# Patient Record
Sex: FEMALE | Race: ASIAN | HISPANIC OR LATINO
[De-identification: names, ages, dates, MRNs, and addresses within clinical notes are randomized per-mention and may not be internally consistent; named-entity substitution may affect disease eponyms.]

---

## 2017-03-06 ENCOUNTER — APPOINTMENT (OUTPATIENT)
Dept: ANTEPARTUM | Facility: CLINIC | Age: 37
End: 2017-03-06

## 2017-03-06 ENCOUNTER — ASOB RESULT (OUTPATIENT)
Age: 37
End: 2017-03-06

## 2017-03-06 PROBLEM — Z00.00 ENCOUNTER FOR PREVENTIVE HEALTH EXAMINATION: Status: ACTIVE | Noted: 2017-03-06

## 2017-03-13 ENCOUNTER — APPOINTMENT (OUTPATIENT)
Dept: ANTEPARTUM | Facility: CLINIC | Age: 37
End: 2017-03-13

## 2017-03-31 ENCOUNTER — ASOB RESULT (OUTPATIENT)
Age: 37
End: 2017-03-31

## 2017-03-31 ENCOUNTER — APPOINTMENT (OUTPATIENT)
Dept: ANTEPARTUM | Facility: CLINIC | Age: 37
End: 2017-03-31

## 2017-04-07 ENCOUNTER — APPOINTMENT (OUTPATIENT)
Dept: ANTEPARTUM | Facility: CLINIC | Age: 37
End: 2017-04-07

## 2017-04-07 ENCOUNTER — ASOB RESULT (OUTPATIENT)
Age: 37
End: 2017-04-07

## 2017-04-14 ENCOUNTER — INPATIENT (INPATIENT)
Facility: HOSPITAL | Age: 37
LOS: 2 days | Discharge: HOME | End: 2017-04-17
Attending: OBSTETRICS & GYNECOLOGY | Admitting: OBSTETRICS & GYNECOLOGY
Payer: MEDICAID

## 2017-04-14 ENCOUNTER — TRANSCRIPTION ENCOUNTER (OUTPATIENT)
Age: 37
End: 2017-04-14

## 2017-04-14 ENCOUNTER — APPOINTMENT (OUTPATIENT)
Dept: ANTEPARTUM | Facility: CLINIC | Age: 37
End: 2017-04-14

## 2017-04-14 ENCOUNTER — ASOB RESULT (OUTPATIENT)
Age: 37
End: 2017-04-14

## 2017-04-14 ENCOUNTER — RESULT REVIEW (OUTPATIENT)
Age: 37
End: 2017-04-14

## 2017-04-14 VITALS — WEIGHT: 198.42 LBS | HEIGHT: 67 IN

## 2017-04-14 DIAGNOSIS — Z34.80 ENCOUNTER FOR SUPERVISION OF OTHER NORMAL PREGNANCY, UNSPECIFIED TRIMESTER: ICD-10-CM

## 2017-04-14 LAB
ABO RH CONFIRMATION: SIGNIFICANT CHANGE UP
ALBUMIN SERPL ELPH-MCNC: 2.3 G/DL — LOW (ref 3.5–5)
ALP SERPL-CCNC: 217 U/L — HIGH (ref 40–120)
ALT FLD-CCNC: 15 U/L DA — SIGNIFICANT CHANGE UP (ref 10–60)
ANION GAP SERPL CALC-SCNC: 9 MMOL/L — SIGNIFICANT CHANGE UP (ref 5–17)
APPEARANCE UR: CLEAR — SIGNIFICANT CHANGE UP
APTT BLD: 26.6 SEC — LOW (ref 27.5–37.4)
AST SERPL-CCNC: 17 U/L — SIGNIFICANT CHANGE UP (ref 10–40)
BASOPHILS # BLD AUTO: 0 K/UL — SIGNIFICANT CHANGE UP (ref 0–0.2)
BASOPHILS NFR BLD AUTO: 0.4 % — SIGNIFICANT CHANGE UP (ref 0–2)
BILIRUB SERPL-MCNC: 0.3 MG/DL — SIGNIFICANT CHANGE UP (ref 0.2–1.2)
BILIRUB UR-MCNC: NEGATIVE — SIGNIFICANT CHANGE UP
BUN SERPL-MCNC: 7 MG/DL — SIGNIFICANT CHANGE UP (ref 7–18)
CALCIUM SERPL-MCNC: 8.4 MG/DL — SIGNIFICANT CHANGE UP (ref 8.4–10.5)
CHLORIDE SERPL-SCNC: 109 MMOL/L — HIGH (ref 96–108)
CO2 SERPL-SCNC: 22 MMOL/L — SIGNIFICANT CHANGE UP (ref 22–31)
COLOR SPEC: YELLOW — SIGNIFICANT CHANGE UP
CREAT SERPL-MCNC: 0.57 MG/DL — SIGNIFICANT CHANGE UP (ref 0.5–1.3)
DIFF PNL FLD: NEGATIVE — SIGNIFICANT CHANGE UP
EOSINOPHIL # BLD AUTO: 0 K/UL — SIGNIFICANT CHANGE UP (ref 0–0.5)
EOSINOPHIL NFR BLD AUTO: 0.4 % — SIGNIFICANT CHANGE UP (ref 0–6)
FIBRINOGEN PPP-MCNC: 586 MG/DL — HIGH (ref 310–510)
GLUCOSE SERPL-MCNC: 91 MG/DL — SIGNIFICANT CHANGE UP (ref 70–99)
GLUCOSE UR QL: NEGATIVE — SIGNIFICANT CHANGE UP
HCT VFR BLD CALC: 31.6 % — LOW (ref 34.5–45)
HGB BLD-MCNC: 10.8 G/DL — LOW (ref 11.5–15.5)
HIV 1 & 2 AB SERPL IA.RAPID: SIGNIFICANT CHANGE UP
INR BLD: 0.96 RATIO — SIGNIFICANT CHANGE UP (ref 0.88–1.16)
KETONES UR-MCNC: NEGATIVE — SIGNIFICANT CHANGE UP
LEUKOCYTE ESTERASE UR-ACNC: ABNORMAL
LYMPHOCYTES # BLD AUTO: 2.2 K/UL — SIGNIFICANT CHANGE UP (ref 1–3.3)
LYMPHOCYTES # BLD AUTO: 23.5 % — SIGNIFICANT CHANGE UP (ref 13–44)
MCHC RBC-ENTMCNC: 29 PG — SIGNIFICANT CHANGE UP (ref 27–34)
MCHC RBC-ENTMCNC: 34.1 GM/DL — SIGNIFICANT CHANGE UP (ref 32–36)
MCV RBC AUTO: 85.2 FL — SIGNIFICANT CHANGE UP (ref 80–100)
MONOCYTES # BLD AUTO: 0.3 K/UL — SIGNIFICANT CHANGE UP (ref 0–0.9)
MONOCYTES NFR BLD AUTO: 3.6 % — SIGNIFICANT CHANGE UP (ref 2–14)
NEUTROPHILS # BLD AUTO: 6.8 K/UL — SIGNIFICANT CHANGE UP (ref 1.8–7.4)
NEUTROPHILS NFR BLD AUTO: 72.2 % — SIGNIFICANT CHANGE UP (ref 43–77)
NITRITE UR-MCNC: NEGATIVE — SIGNIFICANT CHANGE UP
PH UR: 6 — SIGNIFICANT CHANGE UP (ref 4.8–8)
PLATELET # BLD AUTO: 217 K/UL — SIGNIFICANT CHANGE UP (ref 150–400)
POTASSIUM SERPL-MCNC: 3.9 MMOL/L — SIGNIFICANT CHANGE UP (ref 3.5–5.3)
POTASSIUM SERPL-SCNC: 3.9 MMOL/L — SIGNIFICANT CHANGE UP (ref 3.5–5.3)
PROT SERPL-MCNC: 6.8 G/DL — SIGNIFICANT CHANGE UP (ref 6–8.3)
PROT UR-MCNC: NEGATIVE — SIGNIFICANT CHANGE UP
PROTHROM AB SERPL-ACNC: 10.4 SEC — SIGNIFICANT CHANGE UP (ref 9.8–12.7)
RBC # BLD: 3.71 M/UL — LOW (ref 3.8–5.2)
RBC # FLD: 12.4 % — SIGNIFICANT CHANGE UP (ref 10.3–14.5)
SODIUM SERPL-SCNC: 140 MMOL/L — SIGNIFICANT CHANGE UP (ref 135–145)
SP GR SPEC: 1.01 — SIGNIFICANT CHANGE UP (ref 1.01–1.02)
URATE SERPL-MCNC: 4.1 MG/DL — SIGNIFICANT CHANGE UP (ref 2.5–7)
UROBILINOGEN FLD QL: NEGATIVE — SIGNIFICANT CHANGE UP
WBC # BLD: 9.4 K/UL — SIGNIFICANT CHANGE UP (ref 3.8–10.5)
WBC # FLD AUTO: 9.4 K/UL — SIGNIFICANT CHANGE UP (ref 3.8–10.5)

## 2017-04-14 RX ORDER — SIMETHICONE 80 MG/1
80 TABLET, CHEWABLE ORAL EVERY 4 HOURS
Qty: 0 | Refills: 0 | Status: DISCONTINUED | OUTPATIENT
Start: 2017-04-14 | End: 2017-04-17

## 2017-04-14 RX ORDER — TETANUS TOXOID, REDUCED DIPHTHERIA TOXOID AND ACELLULAR PERTUSSIS VACCINE, ADSORBED 5; 2.5; 8; 8; 2.5 [IU]/.5ML; [IU]/.5ML; UG/.5ML; UG/.5ML; UG/.5ML
0.5 SUSPENSION INTRAMUSCULAR ONCE
Qty: 0 | Refills: 0 | Status: DISCONTINUED | OUTPATIENT
Start: 2017-04-14 | End: 2017-04-17

## 2017-04-14 RX ORDER — CEFAZOLIN SODIUM 1 G
2000 VIAL (EA) INJECTION ONCE
Qty: 0 | Refills: 0 | Status: COMPLETED | OUTPATIENT
Start: 2017-04-14 | End: 2017-04-14

## 2017-04-14 RX ORDER — OXYTOCIN 10 UNIT/ML
41.67 VIAL (ML) INJECTION
Qty: 20 | Refills: 0 | Status: DISCONTINUED | OUTPATIENT
Start: 2017-04-14 | End: 2017-04-17

## 2017-04-14 RX ORDER — SODIUM CHLORIDE 9 MG/ML
1000 INJECTION, SOLUTION INTRAVENOUS ONCE
Qty: 0 | Refills: 0 | Status: COMPLETED | OUTPATIENT
Start: 2017-04-14 | End: 2017-04-14

## 2017-04-14 RX ORDER — DIPHENHYDRAMINE HCL 50 MG
25 CAPSULE ORAL EVERY 6 HOURS
Qty: 0 | Refills: 0 | Status: DISCONTINUED | OUTPATIENT
Start: 2017-04-14 | End: 2017-04-17

## 2017-04-14 RX ORDER — FERROUS SULFATE 325(65) MG
325 TABLET ORAL DAILY
Qty: 0 | Refills: 0 | Status: DISCONTINUED | OUTPATIENT
Start: 2017-04-14 | End: 2017-04-15

## 2017-04-14 RX ORDER — SODIUM CHLORIDE 9 MG/ML
1000 INJECTION, SOLUTION INTRAVENOUS
Qty: 0 | Refills: 0 | Status: DISCONTINUED | OUTPATIENT
Start: 2017-04-14 | End: 2017-04-17

## 2017-04-14 RX ORDER — DOCUSATE SODIUM 100 MG
100 CAPSULE ORAL
Qty: 0 | Refills: 0 | Status: DISCONTINUED | OUTPATIENT
Start: 2017-04-14 | End: 2017-04-15

## 2017-04-14 RX ORDER — GLYCERIN ADULT
1 SUPPOSITORY, RECTAL RECTAL AT BEDTIME
Qty: 0 | Refills: 0 | Status: DISCONTINUED | OUTPATIENT
Start: 2017-04-14 | End: 2017-04-17

## 2017-04-14 RX ORDER — ACETAMINOPHEN 500 MG
650 TABLET ORAL EVERY 6 HOURS
Qty: 0 | Refills: 0 | Status: DISCONTINUED | OUTPATIENT
Start: 2017-04-14 | End: 2017-04-17

## 2017-04-14 RX ORDER — SODIUM CHLORIDE 9 MG/ML
1000 INJECTION, SOLUTION INTRAVENOUS
Qty: 0 | Refills: 0 | Status: DISCONTINUED | OUTPATIENT
Start: 2017-04-14 | End: 2017-04-14

## 2017-04-14 RX ORDER — METOCLOPRAMIDE HCL 10 MG
10 TABLET ORAL ONCE
Qty: 0 | Refills: 0 | Status: DISCONTINUED | OUTPATIENT
Start: 2017-04-14 | End: 2017-04-14

## 2017-04-14 RX ORDER — CITRIC ACID/SODIUM CITRATE 300-500 MG
30 SOLUTION, ORAL ORAL ONCE
Qty: 0 | Refills: 0 | Status: DISCONTINUED | OUTPATIENT
Start: 2017-04-14 | End: 2017-04-14

## 2017-04-14 RX ORDER — LANOLIN
1 OINTMENT (GRAM) TOPICAL
Qty: 0 | Refills: 0 | Status: DISCONTINUED | OUTPATIENT
Start: 2017-04-14 | End: 2017-04-17

## 2017-04-14 RX ADMIN — SODIUM CHLORIDE 2000 MILLILITER(S): 9 INJECTION, SOLUTION INTRAVENOUS at 17:12

## 2017-04-14 RX ADMIN — Medication 12 MILLIGRAM(S): at 19:32

## 2017-04-14 RX ADMIN — Medication 125 MILLIUNIT(S)/MIN: at 23:55

## 2017-04-14 RX ADMIN — Medication 100 MILLIGRAM(S): at 23:30

## 2017-04-14 RX ADMIN — Medication 100 MILLIGRAM(S): at 19:45

## 2017-04-15 ENCOUNTER — TRANSCRIPTION ENCOUNTER (OUTPATIENT)
Age: 37
End: 2017-04-15

## 2017-04-15 LAB
BASOPHILS # BLD AUTO: 0.1 K/UL — SIGNIFICANT CHANGE UP (ref 0–0.2)
BASOPHILS NFR BLD AUTO: 0.6 % — SIGNIFICANT CHANGE UP (ref 0–2)
EOSINOPHIL # BLD AUTO: 0 K/UL — SIGNIFICANT CHANGE UP (ref 0–0.5)
EOSINOPHIL NFR BLD AUTO: 0.1 % — SIGNIFICANT CHANGE UP (ref 0–6)
HCT VFR BLD CALC: 32.7 % — LOW (ref 34.5–45)
HGB BLD-MCNC: 10.9 G/DL — LOW (ref 11.5–15.5)
HIV 1+2 AB+HIV1 P24 AG SERPL QL IA: SIGNIFICANT CHANGE UP
LYMPHOCYTES # BLD AUTO: 15.1 % — SIGNIFICANT CHANGE UP (ref 13–44)
LYMPHOCYTES # BLD AUTO: 2.2 K/UL — SIGNIFICANT CHANGE UP (ref 1–3.3)
MCHC RBC-ENTMCNC: 28.9 PG — SIGNIFICANT CHANGE UP (ref 27–34)
MCHC RBC-ENTMCNC: 33.2 GM/DL — SIGNIFICANT CHANGE UP (ref 32–36)
MCV RBC AUTO: 87.2 FL — SIGNIFICANT CHANGE UP (ref 80–100)
MONOCYTES # BLD AUTO: 0.8 K/UL — SIGNIFICANT CHANGE UP (ref 0–0.9)
MONOCYTES NFR BLD AUTO: 5.3 % — SIGNIFICANT CHANGE UP (ref 2–14)
NEUTROPHILS # BLD AUTO: 11.5 K/UL — HIGH (ref 1.8–7.4)
NEUTROPHILS NFR BLD AUTO: 79 % — HIGH (ref 43–77)
PLATELET # BLD AUTO: 193 K/UL — SIGNIFICANT CHANGE UP (ref 150–400)
RBC # BLD: 3.75 M/UL — LOW (ref 3.8–5.2)
RBC # FLD: 12.4 % — SIGNIFICANT CHANGE UP (ref 10.3–14.5)
T PALLIDUM AB TITR SER: NEGATIVE — SIGNIFICANT CHANGE UP
WBC # BLD: 14.6 K/UL — HIGH (ref 3.8–10.5)
WBC # FLD AUTO: 14.6 K/UL — HIGH (ref 3.8–10.5)

## 2017-04-15 PROCEDURE — 88307 TISSUE EXAM BY PATHOLOGIST: CPT | Mod: 26

## 2017-04-15 RX ORDER — IBUPROFEN 200 MG
1 TABLET ORAL
Qty: 120 | Refills: 0 | OUTPATIENT
Start: 2017-04-15 | End: 2017-05-15

## 2017-04-15 RX ORDER — ACETAMINOPHEN 500 MG
1000 TABLET ORAL ONCE
Qty: 0 | Refills: 0 | Status: DISCONTINUED | OUTPATIENT
Start: 2017-04-15 | End: 2017-04-17

## 2017-04-15 RX ORDER — KETOROLAC TROMETHAMINE 30 MG/ML
30 SYRINGE (ML) INJECTION EVERY 6 HOURS
Qty: 0 | Refills: 0 | Status: DISCONTINUED | OUTPATIENT
Start: 2017-04-15 | End: 2017-04-15

## 2017-04-15 RX ORDER — IBUPROFEN 200 MG
600 TABLET ORAL EVERY 6 HOURS
Qty: 0 | Refills: 0 | Status: DISCONTINUED | OUTPATIENT
Start: 2017-04-15 | End: 2017-04-17

## 2017-04-15 RX ORDER — ENOXAPARIN SODIUM 100 MG/ML
40 INJECTION SUBCUTANEOUS EVERY 24 HOURS
Qty: 0 | Refills: 0 | Status: DISCONTINUED | OUTPATIENT
Start: 2017-04-15 | End: 2017-04-17

## 2017-04-15 RX ORDER — DOCUSATE SODIUM 100 MG
100 CAPSULE ORAL
Qty: 0 | Refills: 0 | Status: DISCONTINUED | OUTPATIENT
Start: 2017-04-15 | End: 2017-04-17

## 2017-04-15 RX ORDER — FERROUS SULFATE 325(65) MG
325 TABLET ORAL
Qty: 0 | Refills: 0 | Status: DISCONTINUED | OUTPATIENT
Start: 2017-04-15 | End: 2017-04-17

## 2017-04-15 RX ORDER — DOCUSATE SODIUM 100 MG
1 CAPSULE ORAL
Qty: 28 | Refills: 0 | OUTPATIENT
Start: 2017-04-15 | End: 2017-04-29

## 2017-04-15 RX ORDER — FERROUS SULFATE 325(65) MG
1 TABLET ORAL
Qty: 90 | Refills: 0 | OUTPATIENT
Start: 2017-04-15 | End: 2017-05-15

## 2017-04-15 RX ADMIN — Medication 600 MILLIGRAM(S): at 21:00

## 2017-04-15 RX ADMIN — SIMETHICONE 80 MILLIGRAM(S): 80 TABLET, CHEWABLE ORAL at 21:00

## 2017-04-15 RX ADMIN — Medication 1 TABLET(S): at 12:52

## 2017-04-15 RX ADMIN — Medication 600 MILLIGRAM(S): at 21:30

## 2017-04-15 RX ADMIN — ENOXAPARIN SODIUM 40 MILLIGRAM(S): 100 INJECTION SUBCUTANEOUS at 12:51

## 2017-04-15 RX ADMIN — Medication 100 MILLIGRAM(S): at 18:53

## 2017-04-15 NOTE — DISCHARGE NOTE OB - MEDICATION SUMMARY - MEDICATIONS TO TAKE
I will START or STAY ON the medications listed below when I get home from the hospital:    ibuprofen 600 mg oral tablet  -- 1 tab(s) by mouth every 6 hours, As Needed -for moderate pain  -- Do not take this drug if you are pregnant.  It is very important that you take or use this exactly as directed.  Do not skip doses or discontinue unless directed by your doctor.  May cause drowsiness or dizziness.  Obtain medical advice before taking any non-prescription drugs as some may affect the action of this medication.  Take with food or milk.    -- Indication: For pain    ferrous sulfate 325 mg oral tablet  -- 1 tab(s) by mouth 3 times a day  -- Check with your doctor before becoming pregnant.  Do not chew, break, or crush.  May discolor urine or feces.    -- Indication: For Supplement    PreNata Prenatal Multivitamins with Folic Acid 1 mg oral tablet, chewable  -- 1 tab(s) by mouth once a day  -- Chew tablets before swallowing  May discolor urine or feces.  Take with food or milk.    -- Indication: For Supplement    Colace sodium 100 mg oral capsule  -- 1 cap(s) by mouth 2 times a day  -- Medication should be taken with plenty of water.    -- Indication: For constipation I will START or STAY ON the medications listed below when I get home from the hospital:    ibuprofen 600 mg oral tablet  -- 1 tab(s) by mouth every 6 hours  -- Do not take this drug if you are pregnant.  It is very important that you take or use this exactly as directed.  Do not skip doses or discontinue unless directed by your doctor.  May cause drowsiness or dizziness.  Obtain medical advice before taking any non-prescription drugs as some may affect the action of this medication.  Take with food or milk.    -- Indication: For pain    ferrous sulfate 325 mg (65 mg elemental iron) oral tablet  -- 1 tab(s) by mouth 2 times a day  -- Check with your doctor before becoming pregnant.  Do not chew, break, or crush.  May discolor urine or feces.    -- Indication: For Supplement    multivitamin, prenatal Prenatal Multivitamins with Folic Acid 1 mg oral capsule  -- 1 cap(s) by mouth once a day  -- Indication: For Supplement    Colace 100 mg oral capsule  -- 1 cap(s) by mouth 2 times a day  -- Medication should be taken with plenty of water.    -- Indication: For constipation I will START or STAY ON the medications listed below when I get home from the hospital:    blood pressure cuff  -- Apply on skin to affected area 2 times a day  -- Indication: For monitor blood pressure; Return to ER if blood pressure >150/100    ibuprofen 600 mg oral tablet  -- 1 tab(s) by mouth every 6 hours  -- Do not take this drug if you are pregnant.  It is very important that you take or use this exactly as directed.  Do not skip doses or discontinue unless directed by your doctor.  May cause drowsiness or dizziness.  Obtain medical advice before taking any non-prescription drugs as some may affect the action of this medication.  Take with food or milk.    -- Indication: For pain    ferrous sulfate 325 mg (65 mg elemental iron) oral tablet  -- 1 tab(s) by mouth 2 times a day  -- Check with your doctor before becoming pregnant.  Do not chew, break, or crush.  May discolor urine or feces.    -- Indication: For Supplement    multivitamin, prenatal Prenatal Multivitamins with Folic Acid 1 mg oral capsule  -- 1 cap(s) by mouth once a day  -- Indication: For Supplement    Colace 100 mg oral capsule  -- 1 cap(s) by mouth 2 times a day  -- Medication should be taken with plenty of water.    -- Indication: For constipation

## 2017-04-15 NOTE — DISCHARGE NOTE OB - ADDITIONAL INSTRUCTIONS
no sex, nothing in the vagina for 5-6weeks, no streneous activity, no heavy lifting/bending/pushing/pulling, motrin prn for pain, continue pnv while breastfeeding, f/u in 2 weeks for wound check and postpartum visit in 6 weeks, case mngt is set up for twin gestation

## 2017-04-15 NOTE — DISCHARGE NOTE OB - PATIENT PORTAL LINK FT
“You can access the [facility] Patient Portal, offered by [facility], by registering with the following website: [email or web address]”

## 2017-04-15 NOTE — DISCHARGE NOTE OB - PLAN OF CARE
s/p primary c/s no sex, nothing in the vagina for 5-6weeks, no streneous activity, no heavy lifting/bending/pushing/pulling, motrin prn for pain, continue pnv while breastfeeding, f/u in 2 weeks for wound check and 6 weeks for postpartum visit, case mngt is set up pre-eclampsia precautions follow-up with clinician in 72 hours for blood pressure check, return to ER if blood pressure > 150/100 develop headache, epigastric pain, visual changes or increased swelling in extremities

## 2017-04-15 NOTE — DISCHARGE NOTE OB - CARE PROVIDER_API CALL
Sylvia Carter), Obstetrics and Gynecology  [address] [place] [address]  Phone: [phone]  Fax: [phone]

## 2017-04-15 NOTE — DISCHARGE NOTE OB - CARE PLAN
Principal Discharge DX:	Intrauterine growth restriction of   Goal:	s/p primary c/s  Instructions for follow-up, activity and diet:	no sex, nothing in the vagina for 5-6weeks, no streneous activity, no heavy lifting/bending/pushing/pulling, motrin prn for pain, continue pnv while breastfeeding, f/u in 2 weeks for wound check and 6 weeks for postpartum visit, case mngt is set up  Secondary Diagnosis:	Twin del by c/s w/liveborn mate, 2,000-2,499 g, 35-36 completed weeks Principal Discharge DX:	Intrauterine growth restriction of   Goal:	s/p primary c/s  Instructions for follow-up, activity and diet:	no sex, nothing in the vagina for 5-6weeks, no streneous activity, no heavy lifting/bending/pushing/pulling, motrin prn for pain, continue pnv while breastfeeding, f/u in 2 weeks for wound check and 6 weeks for postpartum visit, case mngt is set up  Secondary Diagnosis:	Twin del by c/s w/liveborn mate, 2,000-2,499 g, 35-36 completed weeks  Goal:	pre-eclampsia precautions  Instructions for follow-up, activity and diet:	follow-up with clinician in 72 hours for blood pressure check, return to ER if blood pressure > 150/100 develop headache, epigastric pain, visual changes or increased swelling in extremities

## 2017-04-15 NOTE — DISCHARGE NOTE OB - HOSPITAL COURSE
patient presented to l&d for primary c/s@ 36 weeks 4 days for IUGR of TIUP mono/di. patient received rescue beta before c/s. patient received post op care and pain mngt. case mngt is set up for twin gestation.

## 2017-04-15 NOTE — DISCHARGE NOTE OB - MATERIALS PROVIDED
Birth Certificate Instructions/Breastfeeding Mother’s Support Group Information/Guide to Postpartum Care/Vaccinations/Shaken Baby Prevention Handout/Breastfeeding Guide and Packet

## 2017-04-16 LAB
HCT VFR BLD CALC: 29.2 % — LOW (ref 34.5–45)
HGB BLD-MCNC: 9.7 G/DL — LOW (ref 11.5–15.5)
MCHC RBC-ENTMCNC: 28.8 PG — SIGNIFICANT CHANGE UP (ref 27–34)
MCHC RBC-ENTMCNC: 33.1 GM/DL — SIGNIFICANT CHANGE UP (ref 32–36)
MCV RBC AUTO: 86.9 FL — SIGNIFICANT CHANGE UP (ref 80–100)
PLATELET # BLD AUTO: 184 K/UL — SIGNIFICANT CHANGE UP (ref 150–400)
RBC # BLD: 3.36 M/UL — LOW (ref 3.8–5.2)
RBC # FLD: 12.3 % — SIGNIFICANT CHANGE UP (ref 10.3–14.5)
WBC # BLD: 12.8 K/UL — HIGH (ref 3.8–10.5)
WBC # FLD AUTO: 12.8 K/UL — HIGH (ref 3.8–10.5)

## 2017-04-16 RX ADMIN — Medication 325 MILLIGRAM(S): at 13:59

## 2017-04-16 RX ADMIN — ENOXAPARIN SODIUM 40 MILLIGRAM(S): 100 INJECTION SUBCUTANEOUS at 14:01

## 2017-04-16 RX ADMIN — Medication 325 MILLIGRAM(S): at 17:37

## 2017-04-16 RX ADMIN — Medication 1 TABLET(S): at 14:01

## 2017-04-16 RX ADMIN — Medication 100 MILLIGRAM(S): at 17:37

## 2017-04-16 RX ADMIN — Medication 100 MILLIGRAM(S): at 06:59

## 2017-04-17 VITALS
HEART RATE: 74 BPM | OXYGEN SATURATION: 100 % | RESPIRATION RATE: 16 BRPM | TEMPERATURE: 98 F | DIASTOLIC BLOOD PRESSURE: 63 MMHG | SYSTOLIC BLOOD PRESSURE: 109 MMHG

## 2017-04-17 LAB
HCT VFR BLD CALC: 27.9 % — LOW (ref 34.5–45)
HGB BLD-MCNC: 9 G/DL — LOW (ref 11.5–15.5)
MCHC RBC-ENTMCNC: 28.4 PG — SIGNIFICANT CHANGE UP (ref 27–34)
MCHC RBC-ENTMCNC: 32.5 GM/DL — SIGNIFICANT CHANGE UP (ref 32–36)
MCV RBC AUTO: 87.4 FL — SIGNIFICANT CHANGE UP (ref 80–100)
PLATELET # BLD AUTO: 207 K/UL — SIGNIFICANT CHANGE UP (ref 150–400)
RBC # BLD: 3.19 M/UL — LOW (ref 3.8–5.2)
RBC # FLD: 12.8 % — SIGNIFICANT CHANGE UP (ref 10.3–14.5)
WBC # BLD: 10.4 K/UL — SIGNIFICANT CHANGE UP (ref 3.8–10.5)
WBC # FLD AUTO: 10.4 K/UL — SIGNIFICANT CHANGE UP (ref 3.8–10.5)

## 2017-04-17 PROCEDURE — 86901 BLOOD TYPING SEROLOGIC RH(D): CPT

## 2017-04-17 PROCEDURE — 84550 ASSAY OF BLOOD/URIC ACID: CPT

## 2017-04-17 PROCEDURE — 85610 PROTHROMBIN TIME: CPT

## 2017-04-17 PROCEDURE — 85730 THROMBOPLASTIN TIME PARTIAL: CPT

## 2017-04-17 PROCEDURE — 87389 HIV-1 AG W/HIV-1&-2 AB AG IA: CPT

## 2017-04-17 PROCEDURE — 85027 COMPLETE CBC AUTOMATED: CPT

## 2017-04-17 PROCEDURE — 86900 BLOOD TYPING SEROLOGIC ABO: CPT

## 2017-04-17 PROCEDURE — 86850 RBC ANTIBODY SCREEN: CPT

## 2017-04-17 PROCEDURE — 86920 COMPATIBILITY TEST SPIN: CPT

## 2017-04-17 PROCEDURE — G0463: CPT

## 2017-04-17 PROCEDURE — 86703 HIV-1/HIV-2 1 RESULT ANTBDY: CPT

## 2017-04-17 PROCEDURE — 59050 FETAL MONITOR W/REPORT: CPT

## 2017-04-17 PROCEDURE — 81001 URINALYSIS AUTO W/SCOPE: CPT

## 2017-04-17 PROCEDURE — 80053 COMPREHEN METABOLIC PANEL: CPT

## 2017-04-17 PROCEDURE — 88307 TISSUE EXAM BY PATHOLOGIST: CPT

## 2017-04-17 PROCEDURE — 85384 FIBRINOGEN ACTIVITY: CPT

## 2017-04-17 PROCEDURE — 59025 FETAL NON-STRESS TEST: CPT

## 2017-04-17 PROCEDURE — 86780 TREPONEMA PALLIDUM: CPT

## 2017-04-17 RX ORDER — DOCUSATE SODIUM 100 MG
1 CAPSULE ORAL
Qty: 60 | Refills: 0 | OUTPATIENT
Start: 2017-04-17 | End: 2017-05-17

## 2017-04-17 RX ORDER — FERROUS SULFATE 325(65) MG
1 TABLET ORAL
Qty: 60 | Refills: 3 | OUTPATIENT
Start: 2017-04-17 | End: 2017-08-14

## 2017-04-17 RX ORDER — FERROUS SULFATE 325(65) MG
1 TABLET ORAL
Qty: 90 | Refills: 0 | OUTPATIENT
Start: 2017-04-17 | End: 2017-05-17

## 2017-04-17 RX ORDER — MAGNESIUM HYDROXIDE 400 MG/1
30 TABLET, CHEWABLE ORAL DAILY
Qty: 0 | Refills: 0 | Status: DISCONTINUED | OUTPATIENT
Start: 2017-04-17 | End: 2017-04-17

## 2017-04-17 RX ORDER — IBUPROFEN 200 MG
1 TABLET ORAL
Qty: 40 | Refills: 2 | OUTPATIENT
Start: 2017-04-17 | End: 2017-05-16

## 2017-04-17 RX ORDER — IBUPROFEN 200 MG
1 TABLET ORAL
Qty: 120 | Refills: 0 | OUTPATIENT
Start: 2017-04-17 | End: 2017-05-17

## 2017-04-17 RX ORDER — DOCUSATE SODIUM 100 MG
1 CAPSULE ORAL
Qty: 28 | Refills: 0 | OUTPATIENT
Start: 2017-04-17 | End: 2017-05-01

## 2017-04-17 RX ADMIN — Medication 600 MILLIGRAM(S): at 01:00

## 2017-04-17 RX ADMIN — Medication 1 TABLET(S): at 12:59

## 2017-04-17 RX ADMIN — Medication 100 MILLIGRAM(S): at 06:48

## 2017-04-17 RX ADMIN — Medication 600 MILLIGRAM(S): at 00:30

## 2017-04-17 RX ADMIN — MAGNESIUM HYDROXIDE 30 MILLILITER(S): 400 TABLET, CHEWABLE ORAL at 01:00

## 2017-04-17 RX ADMIN — Medication 325 MILLIGRAM(S): at 12:59

## 2017-04-17 RX ADMIN — Medication 325 MILLIGRAM(S): at 08:27

## 2017-04-17 RX ADMIN — SIMETHICONE 80 MILLIGRAM(S): 80 TABLET, CHEWABLE ORAL at 00:30

## 2017-04-19 DIAGNOSIS — O30.033 TWIN PREGNANCY, MONOCHORIONIC/DIAMNIOTIC, THIRD TRIMESTER: ICD-10-CM

## 2017-04-19 DIAGNOSIS — O36.5931 MATERNAL CARE FOR OTHER KNOWN OR SUSPECTED POOR FETAL GROWTH, THIRD TRIMESTER, FETUS 1: ICD-10-CM

## 2017-04-19 DIAGNOSIS — Z3A.35 35 WEEKS GESTATION OF PREGNANCY: ICD-10-CM

## 2017-04-19 DIAGNOSIS — Z34.83 ENCOUNTER FOR SUPERVISION OF OTHER NORMAL PREGNANCY, THIRD TRIMESTER: ICD-10-CM

## 2017-04-19 DIAGNOSIS — O36.5932 MATERNAL CARE FOR OTHER KNOWN OR SUSPECTED POOR FETAL GROWTH, THIRD TRIMESTER, FETUS 2: ICD-10-CM

## 2021-01-08 NOTE — PATIENT PROFILE OB - PATIENT REPRESENTATIVE PHONE
Include J-Code In Bill: No J-Code:  Detail Level: None Dupixent Dosing: 600 mg Consent: The risks of pain and injection site reactions were reviewed with the patient prior to the injection. Expiration Date (Optional): 05/2023 Ndc (300 Mg Prefilled Syringe): [address]-55 [phone] Administered By (Optional): JAMIE Rangel Lot # (Optional): [id number] Syringe Size Used (Required For Enhanced Ndc): 300 mg/2ml prefilled syringe Ndc (200 Mg Prefilled Syringe): [address]-08 Use Enhanced Ndc?: Yes Ndc (300 Mg Prefilled Pen): [address]-36